# Patient Record
Sex: FEMALE | Race: WHITE | URBAN - METROPOLITAN AREA
[De-identification: names, ages, dates, MRNs, and addresses within clinical notes are randomized per-mention and may not be internally consistent; named-entity substitution may affect disease eponyms.]

---

## 2024-04-01 ENCOUNTER — OV NP (OUTPATIENT)
Dept: URBAN - METROPOLITAN AREA CLINIC 74 | Facility: CLINIC | Age: 64
End: 2024-04-01
Payer: COMMERCIAL

## 2024-04-01 ENCOUNTER — LAB (OUTPATIENT)
Dept: URBAN - METROPOLITAN AREA CLINIC 74 | Facility: CLINIC | Age: 64
End: 2024-04-01

## 2024-04-01 VITALS
HEIGHT: 64 IN | DIASTOLIC BLOOD PRESSURE: 82 MMHG | WEIGHT: 221 LBS | HEART RATE: 98 BPM | SYSTOLIC BLOOD PRESSURE: 126 MMHG | BODY MASS INDEX: 37.73 KG/M2 | OXYGEN SATURATION: 98 % | TEMPERATURE: 97.5 F

## 2024-04-01 DIAGNOSIS — K86.89 PANCREATIC INSUFFICIENCY: ICD-10-CM

## 2024-04-01 DIAGNOSIS — R19.5 LOOSE STOOLS: ICD-10-CM

## 2024-04-01 DIAGNOSIS — I25.10 CORONARY ARTERY DISEASE INVOLVING NATIVE CORONARY ARTERY OF NATIVE HEART WITHOUT ANGINA PECTORIS: ICD-10-CM

## 2024-04-01 DIAGNOSIS — K44.9 HIATAL HERNIA: ICD-10-CM

## 2024-04-01 DIAGNOSIS — K21.9 CHRONIC GERD: ICD-10-CM

## 2024-04-01 DIAGNOSIS — I73.9 PERIPHERAL VASCULAR DISEASE: ICD-10-CM

## 2024-04-01 DIAGNOSIS — K22.2 SCHATZKI'S RING: ICD-10-CM

## 2024-04-01 DIAGNOSIS — Z79.01 BLOOD THINNED DUE TO LONG-TERM ANTICOAGULANT USE: ICD-10-CM

## 2024-04-01 DIAGNOSIS — R13.19 ESOPHAGEAL DYSPHAGIA: ICD-10-CM

## 2024-04-01 DIAGNOSIS — Z86.010 HX OF COLONIC POLYPS: ICD-10-CM

## 2024-04-01 PROBLEM — 428283002: Status: ACTIVE | Noted: 2024-04-01

## 2024-04-01 PROBLEM — 400047006: Status: ACTIVE | Noted: 2024-04-01

## 2024-04-01 PROBLEM — 235595009: Status: ACTIVE | Noted: 2024-04-01

## 2024-04-01 PROBLEM — 235623002: Status: ACTIVE | Noted: 2024-04-01

## 2024-04-01 PROCEDURE — 99204 OFFICE O/P NEW MOD 45 MIN: CPT | Performed by: INTERNAL MEDICINE

## 2024-04-01 NOTE — HPI-TODAY'S VISIT:
New pt consult to establish care. --Known Schatzki ring and hiatal hernia. --EGD 2014 with GISp. Dilated prior. --CT abd/pelvis 8/2023 negative for acute changes, normal liver/gb/bowel/pancreas --Hx PVD, prior Fempop bypass, left, moderate to severe inguinal stenosis right. --UGI barium swallow 2023, stricture distal esophagus, barium tablet did not pass. --Hx colon polyps. --Family hx colon cancer. --Hx CAD and PVD, on ELIQUIS. --GERD, Protonix long term.  ======= Narrative & ImpressionEXAM:  [facility] ESOPHAGRAM WITH BARIUM (BARIUM SWALLOW) CLINICAL INDICATION:  I69.991 (Dysphagia following unspecified cerebrovascular disease) . TECHNIQUE:  Effervescent crystals along with varying consistencies of barium were ingested. The barium pill was also ingested. Images obtained and reviewed. FLUORO TIME: 2.25 minutes. A total of 124 fluoroscopic images were saved.  COMPARISON:  May 12, 2023 FINDINGS:  Esophageal peristalsis is normal. No obstructing lesions are seen. The mucosa is normal in appearance. A small hiatal hernia is noted.. No gastroesophageal reflux was seen during the exam. Liquid barium progressed from the esophagus into the stomach with no resistance. However, the barium pill did not pass the gastroesophageal junction despite attempts at pushing it into the stomach with barium. IMPRESSION:.         . Barium pill did not pass the gastroesophageal junction despite multiple times at pushing it into the stomach with barium. This may be secondary to narrowing at level of the gastroesophageal junction. Small hiatal hernia is noted. Released By: HENNA VALENCIA MD  7/28/2023 11:36 AM

## 2024-05-03 ENCOUNTER — LAB OUTSIDE AN ENCOUNTER (OUTPATIENT)
Dept: URBAN - METROPOLITAN AREA CLINIC 40 | Facility: CLINIC | Age: 64
End: 2024-05-03

## 2024-05-08 LAB
ADENOVIRUS F 40/41: NOT DETECTED
CAMPYLOBACTER: NOT DETECTED
CLOSTRIDIUM DIFFICILE: NOT DETECTED
ENTAMOEBA HISTOLYTICA: NOT DETECTED
ENTEROAGGREGATIVE E.COLI: NOT DETECTED
ENTEROTOXIGENIC E.COLI: NOT DETECTED
ESCHERICHIA COLI O157: NOT DETECTED
GIARDIA LAMBLIA: NOT DETECTED
NOROVIRUS GI/GII: NOT DETECTED
PANCREATICELASTASE ELISA, STOOL: (no result)
ROTAVIRUS A: NOT DETECTED
SALMONELLA SPP.: NOT DETECTED
SHIGA-LIKE TOXIN PRODUCING E.COLI: NOT DETECTED
SHIGELLA SPP. / ENTEROINVASIVE E.COLI: NOT DETECTED
VIBRIO PARAHAEMOLYTICUS: NOT DETECTED
VIBRIO SPP.: NOT DETECTED
YERSINIA ENTEROCOLITICA: NOT DETECTED

## 2024-05-09 ENCOUNTER — TELEPHONE ENCOUNTER (OUTPATIENT)
Dept: URBAN - METROPOLITAN AREA CLINIC 74 | Facility: CLINIC | Age: 64
End: 2024-05-09

## 2024-05-09 RX ORDER — PANCRELIPASE 36000; 180000; 114000 [USP'U]/1; [USP'U]/1; [USP'U]/1
2 MEALS AND 1 SNACK CAPSULE, DELAYED RELEASE PELLETS ORAL
Qty: 300 TABLETS | Refills: 5 | OUTPATIENT
Start: 2024-05-09 | End: 2024-11-04

## 2024-07-05 ENCOUNTER — CLAIMS CREATED FROM THE CLAIM WINDOW (OUTPATIENT)
Dept: URBAN - METROPOLITAN AREA SURGERY CENTER 30 | Facility: SURGERY CENTER | Age: 64
End: 2024-07-05
Payer: COMMERCIAL

## 2024-07-05 ENCOUNTER — CLAIMS CREATED FROM THE CLAIM WINDOW (OUTPATIENT)
Dept: URBAN - METROPOLITAN AREA CLINIC 4 | Facility: CLINIC | Age: 64
End: 2024-07-05
Payer: COMMERCIAL

## 2024-07-05 DIAGNOSIS — K22.2 ACQUIRED ESOPHAGEAL RING: ICD-10-CM

## 2024-07-05 DIAGNOSIS — K29.60 ADENOPAPILLOMATOSIS GASTRICA: ICD-10-CM

## 2024-07-05 DIAGNOSIS — F41.9 ACUTE ANXIETY: ICD-10-CM

## 2024-07-05 DIAGNOSIS — K29.70 GASTRITIS, UNSPECIFIED, WITHOUT BLEEDING: ICD-10-CM

## 2024-07-05 DIAGNOSIS — K29.50 ANTRAL GASTRITIS: ICD-10-CM

## 2024-07-05 PROCEDURE — 43450 DILATE ESOPHAGUS 1/MULT PASS: CPT | Performed by: INTERNAL MEDICINE

## 2024-07-05 PROCEDURE — 88312 SPECIAL STAINS GROUP 1: CPT | Performed by: PATHOLOGY

## 2024-07-05 PROCEDURE — 88305 TISSUE EXAM BY PATHOLOGIST: CPT | Performed by: PATHOLOGY

## 2024-07-05 PROCEDURE — 43239 EGD BIOPSY SINGLE/MULTIPLE: CPT | Performed by: INTERNAL MEDICINE

## 2024-07-05 PROCEDURE — 00731 ANES UPR GI NDSC PX NOS: CPT | Performed by: NURSE ANESTHETIST, CERTIFIED REGISTERED

## 2024-07-29 ENCOUNTER — DASHBOARD ENCOUNTERS (OUTPATIENT)
Age: 64
End: 2024-07-29

## 2024-07-29 PROBLEM — 428283002: Status: ACTIVE | Noted: 2024-07-29

## 2024-08-05 ENCOUNTER — OFFICE VISIT (OUTPATIENT)
Dept: URBAN - METROPOLITAN AREA CLINIC 74 | Facility: CLINIC | Age: 64
End: 2024-08-05

## 2024-08-05 RX ORDER — PANCRELIPASE 36000; 180000; 114000 [USP'U]/1; [USP'U]/1; [USP'U]/1
2 MEALS AND 1 SNACK CAPSULE, DELAYED RELEASE PELLETS ORAL
Qty: 300 TABLETS | Refills: 5 | COMMUNITY
Start: 2024-05-09 | End: 2024-11-04

## 2024-08-05 NOTE — HPI-TODAY'S VISIT:
The patient is 63-year-old female with past medical history as noted below known to Dr. Prater is presenting to our clinic today to discuss her recent EGD results. The patient is currently on Creon 55431 as directed and Protonix 40 mg daily.   Diagnostic studies: -- Stool study on 05/03/2024 consistent exocrine pancreatic insufficiency.  Procedure: -- EGD with biopsy on 07/05/2024 by Dr. Prater noted 3 cm hiatal hernia.  Mild Schatzki ring.  Dilated.  Gastritis.  Normal examined duodenum.  Biopsy with mild chronic gastritis.  No H.pylori organisms noted no intestinal metaplasia.

## 2024-08-07 ENCOUNTER — OFFICE VISIT (OUTPATIENT)
Dept: URBAN - METROPOLITAN AREA CLINIC 74 | Facility: CLINIC | Age: 64
End: 2024-08-07
Payer: COMMERCIAL

## 2024-08-07 ENCOUNTER — LAB OUTSIDE AN ENCOUNTER (OUTPATIENT)
Dept: URBAN - METROPOLITAN AREA CLINIC 74 | Facility: CLINIC | Age: 64
End: 2024-08-07

## 2024-08-07 VITALS
HEIGHT: 63 IN | BODY MASS INDEX: 40.96 KG/M2 | WEIGHT: 231.2 LBS | DIASTOLIC BLOOD PRESSURE: 80 MMHG | TEMPERATURE: 98.6 F | HEART RATE: 102 BPM | SYSTOLIC BLOOD PRESSURE: 140 MMHG

## 2024-08-07 DIAGNOSIS — Z86.010 PERSONAL HISTORY OF COLONIC POLYPS: ICD-10-CM

## 2024-08-07 DIAGNOSIS — K21.9 CHRONIC GERD: ICD-10-CM

## 2024-08-07 DIAGNOSIS — Z79.02 LONG TERM (CURRENT) USE OF ANTITHROMBOTICS/ANTIPLATELETS: ICD-10-CM

## 2024-08-07 DIAGNOSIS — K22.89 ESOPHAGEAL DILATATION: ICD-10-CM

## 2024-08-07 DIAGNOSIS — K22.2 SCHATZKI'S RING: ICD-10-CM

## 2024-08-07 DIAGNOSIS — K44.9 HIATAL HERNIA: ICD-10-CM

## 2024-08-07 DIAGNOSIS — Z79.01 ANTICOAGULANT LONG-TERM USE: ICD-10-CM

## 2024-08-07 PROBLEM — 710814002: Status: ACTIVE | Noted: 2024-08-07

## 2024-08-07 PROBLEM — 711150003: Status: ACTIVE | Noted: 2024-08-07

## 2024-08-07 PROCEDURE — 99214 OFFICE O/P EST MOD 30 MIN: CPT | Performed by: PHYSICIAN ASSISTANT

## 2024-08-07 RX ORDER — PANCRELIPASE 36000; 180000; 114000 [USP'U]/1; [USP'U]/1; [USP'U]/1
2 MEALS AND 1 SNACK CAPSULE, DELAYED RELEASE PELLETS ORAL
Qty: 300 TABLETS | Refills: 5 | Status: ACTIVE | COMMUNITY
Start: 2024-05-09 | End: 2024-11-04

## 2024-08-07 RX ORDER — PANTOPRAZOLE SODIUM 40 MG/1
1 TABLET TABLET, DELAYED RELEASE ORAL ONCE A DAY
Status: ACTIVE | COMMUNITY

## 2024-08-07 NOTE — HPI-TODAY'S VISIT:
The patient is 63-year-old female with past medical history as noted below known to Dr. Prater is presenting to our clinic today to discuss her recent EGD results. The patient is currently on Creon 59812 as directed and Protonix 40 mg daily. She is also on Creo 36,000 units 1-2 tablets before each meals and 1 tablet before each snnck. She has Cardiac clearnce on the chart from Dr. Jarvis Young on 05/06/2024. The patient has remote history of ETOH abuse. She smokes Tobacco daily. No NSAID's.    Cardiology Visit on 06/11/2024 with Dr. Jarvis Youngas follow: Assessment & Plan:Peripheral artery disease- s/p left fem-pop bypass- Filling defect in right MARYJANE on CT. Angiogram showed calcified lesion in right MARYJANE which is 80%- s/p MARYJANE stent 5/31/24. Doing well.  Needs plavix for a total of 3 months. Stop date 8/31Follow up with me 1 yearFollow up NICOLE in 3 months after stent.  Diagnostic studies: -- Stool study on 05/03/2024 consistent exocrine pancreatic insufficiency.  Procedure: -- EGD with biopsy on 07/05/2024 by Dr. Prater noted 3 cm hiatal hernia.  Mild Schatzki ring.  Dilated.  Gastritis.  Normal examined duodenum.  Biopsy with mild chronic gastritis.  No H.pylori organisms noted no intestinal metaplasia.

## 2024-09-13 ENCOUNTER — TELEPHONE ENCOUNTER (OUTPATIENT)
Dept: URBAN - METROPOLITAN AREA CLINIC 74 | Facility: CLINIC | Age: 64
End: 2024-09-13

## 2024-09-27 ENCOUNTER — TELEPHONE ENCOUNTER (OUTPATIENT)
Dept: URBAN - METROPOLITAN AREA CLINIC 74 | Facility: CLINIC | Age: 64
End: 2024-09-27

## 2024-10-15 ENCOUNTER — TELEPHONE ENCOUNTER (OUTPATIENT)
Dept: URBAN - METROPOLITAN AREA CLINIC 40 | Facility: CLINIC | Age: 64
End: 2024-10-15

## 2024-10-30 ENCOUNTER — TELEPHONE ENCOUNTER (OUTPATIENT)
Dept: URBAN - METROPOLITAN AREA CLINIC 40 | Facility: CLINIC | Age: 64
End: 2024-10-30

## 2024-11-01 ENCOUNTER — TELEPHONE ENCOUNTER (OUTPATIENT)
Dept: URBAN - METROPOLITAN AREA CLINIC 40 | Facility: CLINIC | Age: 64
End: 2024-11-01

## 2024-11-01 PROBLEM — 47367009: Status: ACTIVE | Noted: 2024-11-01

## 2024-11-05 ENCOUNTER — OFFICE VISIT (OUTPATIENT)
Dept: URBAN - METROPOLITAN AREA SURGERY CENTER 30 | Facility: SURGERY CENTER | Age: 64
End: 2024-11-05

## 2024-11-15 ENCOUNTER — TELEPHONE ENCOUNTER (OUTPATIENT)
Dept: URBAN - METROPOLITAN AREA CLINIC 74 | Facility: CLINIC | Age: 64
End: 2024-11-15

## 2024-11-19 ENCOUNTER — LAB OUTSIDE AN ENCOUNTER (OUTPATIENT)
Dept: URBAN - METROPOLITAN AREA CLINIC 74 | Facility: CLINIC | Age: 64
End: 2024-11-19

## 2024-11-19 ENCOUNTER — OFFICE VISIT (OUTPATIENT)
Dept: URBAN - METROPOLITAN AREA CLINIC 74 | Facility: CLINIC | Age: 64
End: 2024-11-19
Payer: COMMERCIAL

## 2024-11-19 VITALS
SYSTOLIC BLOOD PRESSURE: 130 MMHG | DIASTOLIC BLOOD PRESSURE: 86 MMHG | WEIGHT: 224.6 LBS | TEMPERATURE: 97.9 F | HEIGHT: 63 IN | HEART RATE: 96 BPM | BODY MASS INDEX: 39.8 KG/M2

## 2024-11-19 DIAGNOSIS — Z86.0101 HISTORY OF ADENOMATOUS POLYP OF COLON: ICD-10-CM

## 2024-11-19 DIAGNOSIS — K22.89 ESOPHAGEAL DILATATION: ICD-10-CM

## 2024-11-19 DIAGNOSIS — K44.9 HIATAL HERNIA: ICD-10-CM

## 2024-11-19 DIAGNOSIS — K22.2 SCHATZKI'S RING: ICD-10-CM

## 2024-11-19 DIAGNOSIS — K21.9 CHRONIC GERD: ICD-10-CM

## 2024-11-19 DIAGNOSIS — K86.81 EXOCRINE PANCREATIC INSUFFICIENCY: ICD-10-CM

## 2024-11-19 PROCEDURE — 99214 OFFICE O/P EST MOD 30 MIN: CPT | Performed by: PHYSICIAN ASSISTANT

## 2024-11-19 RX ORDER — PANTOPRAZOLE SODIUM 40 MG/1
1 TABLET 1/2 TO 1 HOUR BEFORE MORNING MEAL TABLET, DELAYED RELEASE ORAL ONCE A DAY
Qty: 90 TABLET | Refills: 3

## 2024-11-19 RX ORDER — PANCRELIPASE 36000; 180000; 114000 [USP'U]/1; [USP'U]/1; [USP'U]/1
TAKE 2 CAPSULES DURING EVERY MEAL AND 1 CAPSULE DURING EVERY SNACK CAPSULE, DELAYED RELEASE PELLETS ORAL
Qty: 900 | Refills: 3
Start: 2024-05-09 | End: 2025-11-14

## 2024-11-19 RX ORDER — PANTOPRAZOLE SODIUM 40 MG/1
1 TABLET 1/2 TO 1 HOUR BEFORE MORNING MEAL TABLET, DELAYED RELEASE ORAL ONCE A DAY
Status: ACTIVE | COMMUNITY

## 2024-11-19 RX ORDER — PANTOPRAZOLE SODIUM 40 MG/1
1 TABLET TABLET, DELAYED RELEASE ORAL ONCE A DAY
Status: DISCONTINUED | COMMUNITY

## 2024-11-19 NOTE — HPI-TODAY'S VISIT:
The patient is 64-year-old female with past medical history as noted below known to Dr. Prater is presenting to our clinic today to schedule Colonoscopy. The patient continues on Pantoprazole 40 mg once daily and Creon 36,000 units 1-2 tablets before each meals and 1 tablet before snack. She needs refill on her medications. No new GI issues today.    Diagnostic studies: -- Stool study on 05/03/2024 consistent exocrine pancreatic insufficiency.  Procedures: -- EGD with biopsy on 07/05/2024 by Dr. Prater noted 3 cm hiatal hernia.  Mild Schatzki ring.  Dilated.  Gastritis.  Normal examined duodenum.  Biopsy with mild chronic gastritis.  No H.pylori organisms noted no intestinal metaplasia.  -- Colonoscopy with polypectomy on 11/23/2022 by Dr. Robertson noted a single sessile 3 mm nonbleeding polyp was found in the cecum.  A single piece polypectomy was informed using a cold snare.  The polyp was completely removed.  Two sessile nonbleeding polyps of benign appearance ranging in size from 4-6 millimeter were found in the transverse colon.  A single piecemeal polypectomy was performed using cold snare.  The polyps were completely removed. Two sessile non-bleeding polyps of benign appearance ranging in size from 5-6 mm were found in the rectum.  A single piecemeal polypectomy was performed using cold snare.  The polyps were completely removed.  Repeat colonoscopy in 3 years for surveillance.  Biopsy with tubular adenoma colon polyps.  Transverse colon biopsy with mild focal active colitis no dysplasia or malignancy.
37.2

## 2024-11-26 ENCOUNTER — OFFICE VISIT (OUTPATIENT)
Dept: URBAN - METROPOLITAN AREA CLINIC 74 | Facility: CLINIC | Age: 64
End: 2024-11-26

## 2024-11-26 RX ORDER — PANTOPRAZOLE SODIUM 40 MG/1
1 TABLET TABLET, DELAYED RELEASE ORAL ONCE A DAY
COMMUNITY

## 2024-11-26 NOTE — HPI-TODAY'S VISIT:
The patient is 64-year-old female with past medical history as noted below known to Dr. Prater is presenting to our clinic today to discuss her recent colonoscopy results. The patient is currently on Creon 58077 as directed and Protonix 40 mg daily. She is also on Creo 36,000 units 1-2 tablets before each meals and 1 tablet before each snnck. She has Cardiac clearnce on the chart from Dr. Jarvis Young on 05/06/2024. The patient has remote history of ETOH abuse. She smokes Tobacco daily. No NSAID's.    Diagnostic studies: -- Stool study on 05/03/2024 consistent exocrine pancreatic insufficiency.  Procedures: -- EGD with biopsy on 07/05/2024 by Dr. Prater noted 3 cm hiatal hernia.  Mild Schatzki ring.  Dilated.  Gastritis.  Normal examined duodenum.  Biopsy with mild chronic gastritis.  No H.pylori organisms noted no intestinal metaplasia.

## 2025-02-12 ENCOUNTER — TELEPHONE ENCOUNTER (OUTPATIENT)
Dept: URBAN - METROPOLITAN AREA CLINIC 74 | Facility: CLINIC | Age: 65
End: 2025-02-12